# Patient Record
Sex: MALE | Race: WHITE | NOT HISPANIC OR LATINO
[De-identification: names, ages, dates, MRNs, and addresses within clinical notes are randomized per-mention and may not be internally consistent; named-entity substitution may affect disease eponyms.]

---

## 2017-02-06 ENCOUNTER — MEDICATION RENEWAL (OUTPATIENT)
Age: 72
End: 2017-02-06

## 2017-05-08 ENCOUNTER — RX RENEWAL (OUTPATIENT)
Age: 72
End: 2017-05-08

## 2017-08-07 ENCOUNTER — RX RENEWAL (OUTPATIENT)
Age: 72
End: 2017-08-07

## 2017-08-07 ENCOUNTER — MEDICATION RENEWAL (OUTPATIENT)
Age: 72
End: 2017-08-07

## 2017-08-24 ENCOUNTER — APPOINTMENT (OUTPATIENT)
Dept: CARDIOLOGY | Facility: CLINIC | Age: 72
End: 2017-08-24
Payer: MEDICARE

## 2017-08-24 ENCOUNTER — NON-APPOINTMENT (OUTPATIENT)
Age: 72
End: 2017-08-24

## 2017-08-24 VITALS
HEIGHT: 70 IN | SYSTOLIC BLOOD PRESSURE: 155 MMHG | DIASTOLIC BLOOD PRESSURE: 83 MMHG | HEART RATE: 74 BPM | OXYGEN SATURATION: 94 % | BODY MASS INDEX: 28.06 KG/M2 | WEIGHT: 196 LBS

## 2017-08-24 DIAGNOSIS — I25.5 ISCHEMIC CARDIOMYOPATHY: ICD-10-CM

## 2017-08-24 PROCEDURE — 93000 ELECTROCARDIOGRAM COMPLETE: CPT

## 2017-08-24 PROCEDURE — 99215 OFFICE O/P EST HI 40 MIN: CPT

## 2017-08-31 ENCOUNTER — APPOINTMENT (OUTPATIENT)
Dept: CARDIOLOGY | Facility: CLINIC | Age: 72
End: 2017-08-31
Payer: MEDICARE

## 2017-08-31 PROCEDURE — 93880 EXTRACRANIAL BILAT STUDY: CPT

## 2017-09-08 ENCOUNTER — APPOINTMENT (OUTPATIENT)
Dept: CARDIOLOGY | Facility: CLINIC | Age: 72
End: 2017-09-08
Payer: MEDICARE

## 2017-09-08 PROCEDURE — 93306 TTE W/DOPPLER COMPLETE: CPT

## 2018-05-21 ENCOUNTER — RX RENEWAL (OUTPATIENT)
Age: 73
End: 2018-05-21

## 2018-07-03 ENCOUNTER — APPOINTMENT (OUTPATIENT)
Dept: CARDIOLOGY | Facility: CLINIC | Age: 73
End: 2018-07-03
Payer: MEDICARE

## 2018-07-03 ENCOUNTER — NON-APPOINTMENT (OUTPATIENT)
Age: 73
End: 2018-07-03

## 2018-07-03 VITALS
DIASTOLIC BLOOD PRESSURE: 79 MMHG | WEIGHT: 196 LBS | OXYGEN SATURATION: 95 % | HEIGHT: 70 IN | HEART RATE: 69 BPM | BODY MASS INDEX: 28.06 KG/M2 | SYSTOLIC BLOOD PRESSURE: 149 MMHG

## 2018-07-03 DIAGNOSIS — K22.70 BARRETT'S ESOPHAGUS W/OUT DYSPLASIA: ICD-10-CM

## 2018-07-03 DIAGNOSIS — K21.9 GASTRO-ESOPHAGEAL REFLUX DISEASE W/OUT ESOPHAGITIS: ICD-10-CM

## 2018-07-03 PROCEDURE — 93000 ELECTROCARDIOGRAM COMPLETE: CPT

## 2018-07-03 PROCEDURE — 99215 OFFICE O/P EST HI 40 MIN: CPT

## 2018-07-03 RX ORDER — PANTOPRAZOLE 40 MG/1
40 TABLET, DELAYED RELEASE ORAL
Qty: 90 | Refills: 0 | Status: ACTIVE | COMMUNITY
Start: 2017-11-21

## 2018-08-15 ENCOUNTER — RX RENEWAL (OUTPATIENT)
Age: 73
End: 2018-08-15

## 2018-08-28 ENCOUNTER — MOBILE ON CALL (OUTPATIENT)
Age: 73
End: 2018-08-28

## 2018-08-29 ENCOUNTER — APPOINTMENT (OUTPATIENT)
Dept: CARDIOLOGY | Facility: CLINIC | Age: 73
End: 2018-08-29
Payer: MEDICARE

## 2018-08-29 PROCEDURE — A9500: CPT

## 2018-08-29 PROCEDURE — 93015 CV STRESS TEST SUPVJ I&R: CPT

## 2018-08-29 PROCEDURE — 78452 HT MUSCLE IMAGE SPECT MULT: CPT

## 2018-10-15 ENCOUNTER — APPOINTMENT (OUTPATIENT)
Dept: CARDIOLOGY | Facility: CLINIC | Age: 73
End: 2018-10-15
Payer: MEDICARE

## 2018-10-15 ENCOUNTER — NON-APPOINTMENT (OUTPATIENT)
Age: 73
End: 2018-10-15

## 2018-10-15 VITALS
OXYGEN SATURATION: 94 % | WEIGHT: 194 LBS | HEIGHT: 70 IN | BODY MASS INDEX: 27.77 KG/M2 | SYSTOLIC BLOOD PRESSURE: 153 MMHG | DIASTOLIC BLOOD PRESSURE: 80 MMHG | HEART RATE: 62 BPM

## 2018-10-15 VITALS — DIASTOLIC BLOOD PRESSURE: 80 MMHG | SYSTOLIC BLOOD PRESSURE: 140 MMHG

## 2018-10-15 PROCEDURE — 99214 OFFICE O/P EST MOD 30 MIN: CPT

## 2018-10-15 PROCEDURE — 93000 ELECTROCARDIOGRAM COMPLETE: CPT

## 2018-10-15 RX ORDER — RANITIDINE 150 MG/1
150 TABLET ORAL
Qty: 30 | Refills: 0 | Status: DISCONTINUED | COMMUNITY
Start: 2018-07-03 | End: 2018-10-15

## 2018-10-25 ENCOUNTER — APPOINTMENT (OUTPATIENT)
Dept: CARDIOLOGY | Facility: CLINIC | Age: 73
End: 2018-10-25
Payer: MEDICARE

## 2018-10-25 PROCEDURE — 93880 EXTRACRANIAL BILAT STUDY: CPT

## 2018-10-29 LAB
25(OH)D3 SERPL-MCNC: 29.5 NG/ML
ALBUMIN SERPL ELPH-MCNC: 4.5 G/DL
ALP BLD-CCNC: 52 U/L
ALT SERPL-CCNC: 13 U/L
ANION GAP SERPL CALC-SCNC: 13 MMOL/L
APPEARANCE: CLEAR
AST SERPL-CCNC: 27 U/L
BASOPHILS # BLD AUTO: 0.05 K/UL
BASOPHILS NFR BLD AUTO: 0.7 %
BILIRUB SERPL-MCNC: 0.3 MG/DL
BILIRUBIN URINE: NEGATIVE
BLOOD URINE: NEGATIVE
BUN SERPL-MCNC: 17 MG/DL
CALCIUM SERPL-MCNC: 9.9 MG/DL
CHLORIDE SERPL-SCNC: 103 MMOL/L
CHOLEST SERPL-MCNC: 176 MG/DL
CHOLEST/HDLC SERPL: 3 RATIO
CO2 SERPL-SCNC: 29 MMOL/L
COLOR: YELLOW
CREAT SERPL-MCNC: 1.11 MG/DL
EOSINOPHIL # BLD AUTO: 0.13 K/UL
EOSINOPHIL NFR BLD AUTO: 1.9 %
GLUCOSE QUALITATIVE U: NEGATIVE MG/DL
GLUCOSE SERPL-MCNC: 94 MG/DL
HBA1C MFR BLD HPLC: 5.6 %
HCT VFR BLD CALC: 46.4 %
HDLC SERPL-MCNC: 58 MG/DL
HGB BLD-MCNC: 14.2 G/DL
IMM GRANULOCYTES NFR BLD AUTO: 0.3 %
KETONES URINE: NEGATIVE
LDLC SERPL CALC-MCNC: 104 MG/DL
LEUKOCYTE ESTERASE URINE: NEGATIVE
LYMPHOCYTES # BLD AUTO: 0.87 K/UL
LYMPHOCYTES NFR BLD AUTO: 12.6 %
MAN DIFF?: NORMAL
MCHC RBC-ENTMCNC: 30.6 GM/DL
MCHC RBC-ENTMCNC: 31.1 PG
MCV RBC AUTO: 101.5 FL
MONOCYTES # BLD AUTO: 0.5 K/UL
MONOCYTES NFR BLD AUTO: 7.2 %
NEUTROPHILS # BLD AUTO: 5.34 K/UL
NEUTROPHILS NFR BLD AUTO: 77.3 %
NITRITE URINE: NEGATIVE
PH URINE: 5.5
PLATELET # BLD AUTO: 354 K/UL
POTASSIUM SERPL-SCNC: 5.1 MMOL/L
PROT SERPL-MCNC: 7.2 G/DL
PROTEIN URINE: NEGATIVE MG/DL
RBC # BLD: 4.57 M/UL
RBC # FLD: 13.8 %
SODIUM SERPL-SCNC: 145 MMOL/L
SPECIFIC GRAVITY URINE: 1.01
TRIGL SERPL-MCNC: 69 MG/DL
UROBILINOGEN URINE: NEGATIVE MG/DL
WBC # FLD AUTO: 6.91 K/UL

## 2019-06-12 ENCOUNTER — RX RENEWAL (OUTPATIENT)
Age: 74
End: 2019-06-12

## 2019-08-26 ENCOUNTER — RX RENEWAL (OUTPATIENT)
Age: 74
End: 2019-08-26

## 2020-05-18 ENCOUNTER — EMERGENCY (EMERGENCY)
Facility: HOSPITAL | Age: 75
LOS: 1 days | Discharge: HOME | End: 2020-05-18
Attending: EMERGENCY MEDICINE | Admitting: EMERGENCY MEDICINE
Payer: MEDICARE

## 2020-05-18 VITALS
TEMPERATURE: 98 F | RESPIRATION RATE: 18 BRPM | WEIGHT: 190.04 LBS | HEART RATE: 71 BPM | DIASTOLIC BLOOD PRESSURE: 86 MMHG | SYSTOLIC BLOOD PRESSURE: 173 MMHG | HEIGHT: 70 IN | OXYGEN SATURATION: 96 %

## 2020-05-18 DIAGNOSIS — Z95.1 PRESENCE OF AORTOCORONARY BYPASS GRAFT: Chronic | ICD-10-CM

## 2020-05-18 PROCEDURE — 12001 RPR S/N/AX/GEN/TRNK 2.5CM/<: CPT

## 2020-05-18 PROCEDURE — 99283 EMERGENCY DEPT VISIT LOW MDM: CPT | Mod: 25

## 2020-05-18 RX ORDER — BALSALAZIDE DISODIUM 750 MG/1
3 CAPSULE ORAL
Qty: 0 | Refills: 0 | DISCHARGE

## 2020-05-18 RX ORDER — CEPHALEXIN 500 MG
1 CAPSULE ORAL
Qty: 21 | Refills: 0
Start: 2020-05-18 | End: 2020-05-24

## 2020-05-18 RX ORDER — SIMVASTATIN 20 MG/1
1 TABLET, FILM COATED ORAL
Qty: 0 | Refills: 0 | DISCHARGE

## 2020-05-18 RX ORDER — PANTOPRAZOLE SODIUM 20 MG/1
1 TABLET, DELAYED RELEASE ORAL
Qty: 0 | Refills: 0 | DISCHARGE

## 2020-05-18 RX ORDER — METOPROLOL TARTRATE 50 MG
1 TABLET ORAL
Qty: 0 | Refills: 0 | DISCHARGE

## 2020-05-18 RX ORDER — ASPIRIN/CALCIUM CARB/MAGNESIUM 324 MG
1 TABLET ORAL
Qty: 0 | Refills: 0 | DISCHARGE

## 2020-05-18 NOTE — ED PROVIDER NOTE - CLINICAL SUMMARY MEDICAL DECISION MAKING FREE TEXT BOX
73 yo M p/w lac to 2nd digit DIP 2x1 cm U shaped with clean kitchen knife. low suspicion for Foreign body and furthermore wound was extensively irrigated by me. tdap UTD (within 10 years)

## 2020-05-18 NOTE — ED PROVIDER NOTE - OBJECTIVE STATEMENT
73 yo M p/w lac to left 2nd digit lateral DIP, was using a clean kitchen knife to cut rope for packaging. last tdap within 10 years (pt thinks within the last 5 years). denies fever, cough, sob, recent travel.

## 2020-05-18 NOTE — ED PROVIDER NOTE - PATIENT PORTAL LINK FT
You can access the FollowMyHealth Patient Portal offered by [facility] by registering at the following website: [email or web address]. By joining [facility]’s FollowMyHealth portal, you will also be able to view your health information using other applications (apps) compatible with our system.

## 2020-05-27 ENCOUNTER — EMERGENCY (EMERGENCY)
Facility: HOSPITAL | Age: 75
LOS: 1 days | Discharge: HOME | End: 2020-05-27
Attending: EMERGENCY MEDICINE | Admitting: EMERGENCY MEDICINE
Payer: MEDICARE

## 2020-05-27 VITALS
SYSTOLIC BLOOD PRESSURE: 169 MMHG | RESPIRATION RATE: 17 BRPM | TEMPERATURE: 98 F | DIASTOLIC BLOOD PRESSURE: 94 MMHG | WEIGHT: 195.11 LBS | HEART RATE: 68 BPM | OXYGEN SATURATION: 95 % | HEIGHT: 70 IN

## 2020-05-27 DIAGNOSIS — Z95.1 PRESENCE OF AORTOCORONARY BYPASS GRAFT: Chronic | ICD-10-CM

## 2020-05-27 PROBLEM — K52.9 NONINFECTIVE GASTROENTERITIS AND COLITIS, UNSPECIFIED: Chronic | Status: ACTIVE | Noted: 2020-05-18

## 2020-05-27 PROBLEM — C61 MALIGNANT NEOPLASM OF PROSTATE: Chronic | Status: ACTIVE | Noted: 2020-05-18

## 2020-05-27 PROBLEM — K22.70 BARRETT'S ESOPHAGUS WITHOUT DYSPLASIA: Chronic | Status: ACTIVE | Noted: 2020-05-18

## 2020-05-27 PROBLEM — I63.9 CEREBRAL INFARCTION, UNSPECIFIED: Chronic | Status: ACTIVE | Noted: 2020-05-18

## 2020-05-27 PROCEDURE — G0463: CPT

## 2020-05-27 PROCEDURE — L9995: CPT

## 2020-05-27 NOTE — ED STATDOCS - CLINICAL SUMMARY MEDICAL DECISION MAKING FREE TEXT BOX
70 y/o M with L 1st digit lac that was repairs 10 days ago presenting for suture removal 70 y/o M with L 1st digit lac that was repairs 10 days ago presenting for suture removal, redness noted likely due to patients reported use of hydrogen peroxide, no signs of pus or drainage, plan= suture removal and d/c with strict return precautions

## 2020-05-27 NOTE — ED STATDOCS - ATTENDING CONTRIBUTION TO CARE
Pt is a 75 yo male who presents to the ED for suture removal.  PMHx of Camarena's esophagus, Colitis    Prostate cancer, stroke.  Pt is right hand dominant.  He reports that on 5/18 he was seen in the ED after he sustained a laceration to his left hand second digit.  Pt reports that he was using a knife to cut rope for packaging when the knife slipped cutting his left hand second digit.  He was seen in the ED, wound was cleaned, and repaired with sutures.  Pt reports that his Tetanus was UTD.  He was discharged home on keflex and completed the course.  He presents today for suture removal.  Pt reports that he has had no complications since procedure.  He denies pain, swelling, or drainage from the site. Denies limited ROM.  Pt reports that he has been cleaning the site with peroxide several times a day and has also been applying Bacitracin to the wound.  Denies fever, chills, CP, SOB.  Denies numbness or tingling to ext.  On exam pt sitting in bed NAD,  Left hand 2nd digit:       75 yo M p/w lac to left 2nd digit lateral DIP, was using a clean kitchen knife to cut rope for packaging. last tdap within 10 years (pt thinks within the last 5 years). denies fever, cough, sob, recent travel.    73 y/o M presents to ED for suture removal. Had cut L second digit with kitchen knife on 5/18/20. Was seen in this ED, had lac repaired with 10 sutures and placed on keflex which he completed. Denies fever, chills, pain or swelling to digit. States has been suing peroxide, Pt is a 75 yo male who presents to the ED for suture removal.  PMHx of Camarena's esophagus, Colitis    Prostate cancer, stroke.  Pt is right hand dominant.  He reports that on 5/18 he was seen in the ED after he sustained a laceration to his left hand second digit.  Pt reports that he was using a knife to cut rope for packaging when the knife slipped cutting his left hand second digit.  He was seen in the ED, wound was cleaned, and repaired with sutures.  Pt reports that his Tetanus was UTD.  He was discharged home on keflex and completed the course.  He presents today for suture removal.  Pt reports that he has had no complications since procedure.  He denies pain, swelling, or drainage from the site. Denies limited ROM.  Pt reports that he has been cleaning the site with peroxide several times a day and has also been applying Bacitracin to the wound.  Denies fever, chills, CP, SOB.  Denies numbness or tingling to ext.  On exam pt sitting in bed NAD,  Left hand 2nd digit: 1.5 cm x 2.5 cm U shaped healing lac noted to L 2nd digit distal aspect with sutures in place and surrounding redness but no increased warmth.  Appears to have mild maceration of skin likely secondary to Peroxide washing. No swelling or purulent drainage noted. FROM of digit with intact sensation, cap refill less then 2 seconds.  Pt presenting for suture removal mild skin irritation likely secondary to Peroxide use Pt is a 75 yo male who presents to the ED for suture removal.  PMHx of Camarena's esophagus, Colitis    Prostate cancer, stroke.  Pt is right hand dominant.  He reports that on 5/18 he was seen in the ED after he sustained a laceration to his left hand second digit.  Pt reports that he was using a knife to cut rope for packaging when the knife slipped cutting his left hand second digit.  He was seen in the ED, wound was cleaned, and repaired with sutures.  Pt reports that his Tetanus was UTD.  He was discharged home on keflex and completed the course.  He presents today for suture removal.  Pt reports that he has had no complications since procedure.  He denies pain, swelling, or drainage from the site. Denies limited ROM.  Pt reports that he has been cleaning the site with peroxide several times a day and has also been applying Bacitracin to the wound.  Denies fever, chills, CP, SOB.  Denies numbness or tingling to ext.  On exam pt sitting in bed NAD,  Left hand 2nd digit: 1.5 cm x 2.5 cm U shaped healing lac noted to L 2nd digit distal aspect with sutures in place and surrounding redness but no increased warmth.  Appears to have mild maceration of skin likely secondary to Peroxide washing. No swelling or purulent drainage noted. FROM of digit with intact sensation, cap refill less then 2 seconds. No TTP.  Pt presenting for suture removal mild skin irritation likely secondary to Peroxide use.  No evidence of cellulitis at this time redness appeared to be secondary to skin irritation.  Sutures removed, Bacitracin applied.  Pt advised to stop Peroxide use.  Will discharge home.  Pt instructed to monitor site, apply Bacitracin and return immediately for increased redness, warmth, pain or for any other concerns.

## 2020-05-27 NOTE — ED ADULT NURSE NOTE - NSIMPLEMENTINTERV_GEN_ALL_ED
Implemented All Universal Safety Interventions:  [place] to call system. Call bell, personal items and telephone within reach. Instruct patient to call for assistance. Room bathroom lighting operational. Non-slip footwear when patient is off stretcher. Physically safe environment: no spills, clutter or unnecessary equipment. Stretcher in lowest position, wheels locked, appropriate side rails in place.

## 2020-05-27 NOTE — ED STATDOCS - NSFOLLOWUPINSTRUCTIONS_ED_ALL_ED_FT
KEEP AREA CLEAN AND DRY  APPLY BACITRACIN 2 TIMES DAILY  KEEP COVERED IF YOU GO OUT  RETURN FOR ANY FEVER, REDNESS, SWELLING, PUS, PAIN IN FINGER  STOP USING HYDROGEN PEROXIDE ON WOUND       Suture/Staple Removal    After having your stitches or staples removed it is typical to have minor discomfort, swelling, or redness in the area. The wound is still healing so continue to protect it from injury. Keep the wound dry and if given creams, ointments, or medication, take as instructed to by your health care professional.    SEEK IMMEDIATE MEDICAL CARE IF YOU HAVE ANY OF THE FOLLOWING SYMPTOMS: increasing redness/swelling/pain in the wound, pus coming from the wound, bad smell coming from the wound, or fever.

## 2020-05-27 NOTE — ED ADULT NURSE NOTE - OBJECTIVE STATEMENT
Presents to ER with left 2nd digit suture removal. Pt states he cut himself with a knife.  Site slightly red, Dr Reeves aware, denies any pain or discharge.  Pt states he was placed on antibx and did complete dose prescribed.

## 2020-05-27 NOTE — ED STATDOCS - OBJECTIVE STATEMENT
73 y/o M presents to ED for suture removal. Had cut L second digit with kitchen knife on 5/18/20. Was seen in this ED, had lac repaired with 10 sutures and placed on keflex which he completed. Denies fever, chills, pain or swelling to digit. States has been suing peroxide,

## 2020-05-27 NOTE — ED STATDOCS - PHYSICAL EXAMINATION
1.5 cm x 2.5 cm U shaped healing lac noted to L 2nd digit with sutures in place and surrounding redness, no pus or drainage, sensation intact

## 2020-06-01 ENCOUNTER — RX RENEWAL (OUTPATIENT)
Age: 75
End: 2020-06-01

## 2020-07-24 ENCOUNTER — RX RENEWAL (OUTPATIENT)
Age: 75
End: 2020-07-24

## 2020-09-25 ENCOUNTER — RX RENEWAL (OUTPATIENT)
Age: 75
End: 2020-09-25

## 2020-10-05 ENCOUNTER — NON-APPOINTMENT (OUTPATIENT)
Age: 75
End: 2020-10-05

## 2020-10-05 ENCOUNTER — APPOINTMENT (OUTPATIENT)
Dept: CARDIOLOGY | Facility: CLINIC | Age: 75
End: 2020-10-05
Payer: MEDICARE

## 2020-10-05 VITALS
OXYGEN SATURATION: 94 % | DIASTOLIC BLOOD PRESSURE: 80 MMHG | WEIGHT: 197 LBS | SYSTOLIC BLOOD PRESSURE: 160 MMHG | HEIGHT: 70 IN | BODY MASS INDEX: 28.2 KG/M2 | HEART RATE: 70 BPM

## 2020-10-05 PROCEDURE — 99214 OFFICE O/P EST MOD 30 MIN: CPT

## 2020-10-05 PROCEDURE — 93000 ELECTROCARDIOGRAM COMPLETE: CPT

## 2020-10-05 NOTE — DISCUSSION/SUMMARY
[FreeTextEntry1] : Christopher has been feeling well. His blood pressure has been somewhat high even at home, and the range of 140/90. He will start amlodipine 5 mg daily.\par \par He will get blood work.\par \par He will schedule an echo.\par \par He will see me in the office in one month for another assessment of his blood pressure.\par \par

## 2020-10-05 NOTE — REASON FOR VISIT
[Follow-Up - Clinic] : a clinic follow-up of [Coronary Artery Disease] : coronary artery disease [Hyperlipidemia] : hyperlipidemia [Hypertension] : hypertension [FreeTextEntry1] : PVC's

## 2020-10-05 NOTE — CARDIOLOGY SUMMARY
[Normal] : normal [No Ischemia] : no Ischemia [No Exercise Ind Arr] : no exercise induced arrhythmias [No Symptoms] : no Symptoms [LVEF ___%] : LVEF [unfilled]% [None] : no pulmonary hypertension [Enlarged] : enlarged LA size [Mild] : mild mitral regurgitation [___] : [unfilled]

## 2020-10-05 NOTE — HISTORY OF PRESENT ILLNESS
[FreeTextEntry1] : Christopher El presented to the office today for a follow up evaluation. He was last seen in the office in 2018.\par \par He is now 75 years old, with a history of coronary artery disease, status post bypass in January of 2007. He has a history of treated hypercholesterolemia as well as treated hypertension. 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 \par \par He presented to the office today having been feeling reasonably well overall. His reflux symptoms are well controlled. He does not report symptoms of angina, heart failure or arrhythmia.

## 2020-10-05 NOTE — PHYSICAL EXAM
[General Appearance - Well Developed] : well developed [Normal Appearance] : normal appearance [Well Groomed] : well groomed [General Appearance - Well Nourished] : well nourished [No Deformities] : no deformities [General Appearance - In No Acute Distress] : no acute distress [Normal Conjunctiva] : the conjunctiva exhibited no abnormalities [Eyelids - No Xanthelasma] : the eyelids demonstrated no xanthelasmas [Normal Oral Mucosa] : normal oral mucosa [No Oral Pallor] : no oral pallor [No Oral Cyanosis] : no oral cyanosis [Normal Jugular Venous A Waves Present] : normal jugular venous A waves present [Normal Jugular Venous V Waves Present] : normal jugular venous V waves present [No Jugular Venous Swain A Waves] : no jugular venous swain A waves [Respiration, Rhythm And Depth] : normal respiratory rhythm and effort [Exaggerated Use Of Accessory Muscles For Inspiration] : no accessory muscle use [Auscultation Breath Sounds / Voice Sounds] : lungs were clear to auscultation bilaterally [Abdomen Soft] : soft [Abdomen Tenderness] : non-tender [Abdomen Mass (___ Cm)] : no abdominal mass palpated [Abnormal Walk] : normal gait [Gait - Sufficient For Exercise Testing] : the gait was sufficient for exercise testing [Nail Clubbing] : no clubbing of the fingernails [Cyanosis, Localized] : no localized cyanosis [Petechial Hemorrhages (___cm)] : no petechial hemorrhages [Skin Color & Pigmentation] : normal skin color and pigmentation [] : no rash [No Venous Stasis] : no venous stasis [Skin Lesions] : no skin lesions [No Skin Ulcers] : no skin ulcer [No Xanthoma] : no  xanthoma was observed [Oriented To Time, Place, And Person] : oriented to person, place, and time [Affect] : the affect was normal [Mood] : the mood was normal [No Anxiety] : not feeling anxious [Normal] : normal [Normal Rate] : normal [Rhythm Regular] : regular [Normal S1] : normal S1 [Normal S2] : normal S2 [No Gallop] : no gallop heard [No Murmur] : no murmurs heard [2+] : left 2+ [No Pitting Edema] : no pitting edema present [S3] : no S3 [S4] : no S4 [Right Carotid Bruit] : no bruit heard over the right carotid [Left Carotid Bruit] : no bruit heard over the left carotid [Right Femoral Bruit] : no bruit heard over the right femoral artery [Left Femoral Bruit] : no bruit heard over the left femoral artery [Bruit] : no bruit heard

## 2020-11-10 ENCOUNTER — APPOINTMENT (OUTPATIENT)
Dept: CARDIOLOGY | Facility: CLINIC | Age: 75
End: 2020-11-10
Payer: MEDICARE

## 2020-11-10 VITALS
OXYGEN SATURATION: 95 % | HEART RATE: 85 BPM | DIASTOLIC BLOOD PRESSURE: 81 MMHG | BODY MASS INDEX: 28.63 KG/M2 | HEIGHT: 70 IN | WEIGHT: 200 LBS | SYSTOLIC BLOOD PRESSURE: 158 MMHG

## 2020-11-10 PROCEDURE — 99214 OFFICE O/P EST MOD 30 MIN: CPT

## 2020-11-10 NOTE — PHYSICAL EXAM
[General Appearance - Well Developed] : well developed [Normal Appearance] : normal appearance [Well Groomed] : well groomed [General Appearance - Well Nourished] : well nourished [No Deformities] : no deformities [General Appearance - In No Acute Distress] : no acute distress [Normal Conjunctiva] : the conjunctiva exhibited no abnormalities [Eyelids - No Xanthelasma] : the eyelids demonstrated no xanthelasmas [Normal Oral Mucosa] : normal oral mucosa [No Oral Pallor] : no oral pallor [No Oral Cyanosis] : no oral cyanosis [Normal Jugular Venous A Waves Present] : normal jugular venous A waves present [Normal Jugular Venous V Waves Present] : normal jugular venous V waves present [No Jugular Venous Swain A Waves] : no jugular venous swain A waves [Abnormal Walk] : normal gait [Gait - Sufficient For Exercise Testing] : the gait was sufficient for exercise testing [Nail Clubbing] : no clubbing of the fingernails [Cyanosis, Localized] : no localized cyanosis [Petechial Hemorrhages (___cm)] : no petechial hemorrhages [Skin Color & Pigmentation] : normal skin color and pigmentation [] : no rash [No Venous Stasis] : no venous stasis [Skin Lesions] : no skin lesions [No Skin Ulcers] : no skin ulcer [No Xanthoma] : no  xanthoma was observed [Oriented To Time, Place, And Person] : oriented to person, place, and time [Affect] : the affect was normal [Mood] : the mood was normal [No Anxiety] : not feeling anxious

## 2020-11-10 NOTE — HISTORY OF PRESENT ILLNESS
[FreeTextEntry1] : Christopher El presented to the office today for a follow up evaluation. He was last seen in the office last month.\par \par He is now 75 years old, with a history of coronary artery disease, status post bypass in January of 2007. He has a history of treated hypercholesterolemia as well as treated hypertension. 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 \par \par At the last visit, his blood pressure was elevated, and I started amlodipine 5 mg daily. He is feeling reasonably well overall. He does not report symptoms of angina, heart failure or arrhythmia. He's been checking his blood pressure fairly regularly. It is commonly in the 140s and 150s.

## 2020-11-10 NOTE — DISCUSSION/SUMMARY
[FreeTextEntry1] : Christopher has been feeling well. His blood pressure has been somewhat high even at home, and the range of 140-150. He will increase amlodipine to 10mg daily.\par \par He has an echo scheduled soon.\par \par He will see me in the office in 3 weeks for another assessment of his blood pressure. He will bring a list of readings to review.\par \par

## 2020-11-20 ENCOUNTER — APPOINTMENT (OUTPATIENT)
Dept: CARDIOLOGY | Facility: CLINIC | Age: 75
End: 2020-11-20
Payer: MEDICARE

## 2020-11-20 PROCEDURE — 93306 TTE W/DOPPLER COMPLETE: CPT

## 2020-12-01 ENCOUNTER — APPOINTMENT (OUTPATIENT)
Dept: CARDIOLOGY | Facility: CLINIC | Age: 75
End: 2020-12-01
Payer: MEDICARE

## 2020-12-01 VITALS
HEIGHT: 70 IN | SYSTOLIC BLOOD PRESSURE: 141 MMHG | BODY MASS INDEX: 28.63 KG/M2 | WEIGHT: 200 LBS | OXYGEN SATURATION: 95 % | DIASTOLIC BLOOD PRESSURE: 76 MMHG | HEART RATE: 80 BPM

## 2020-12-01 PROCEDURE — 99214 OFFICE O/P EST MOD 30 MIN: CPT

## 2020-12-01 NOTE — DISCUSSION/SUMMARY
[FreeTextEntry1] : Christopher has been feeling well. His blood pressure is now excellent. We will make no additional changes.\par  \par \par He will see me in the office in 6 months.

## 2020-12-01 NOTE — HISTORY OF PRESENT ILLNESS
[FreeTextEntry1] : Christopher El presented to the office today for a follow up evaluation. He was last seen in the office 2 weeks ago.\par \par He is now 75 years old, with a history of coronary artery disease, status post bypass in January of 2007. He has a history of treated hypercholesterolemia as well as treated hypertension. 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 \par \par At the last visit, his blood pressure was elevated, and I increased amlodipine to 10 mg daily. \par \par He is feeling reasonably well overall. He does not report symptoms of angina, heart failure or arrhythmia. He's been checking his blood pressure fairly regularly. It is commonly in the 120s.

## 2021-09-23 ENCOUNTER — RX RENEWAL (OUTPATIENT)
Age: 76
End: 2021-09-23

## 2021-09-23 ENCOUNTER — NON-APPOINTMENT (OUTPATIENT)
Age: 76
End: 2021-09-23

## 2021-09-23 ENCOUNTER — APPOINTMENT (OUTPATIENT)
Dept: CARDIOLOGY | Facility: CLINIC | Age: 76
End: 2021-09-23
Payer: MEDICARE

## 2021-09-23 VITALS
DIASTOLIC BLOOD PRESSURE: 79 MMHG | BODY MASS INDEX: 28.63 KG/M2 | HEART RATE: 69 BPM | OXYGEN SATURATION: 96 % | WEIGHT: 200 LBS | SYSTOLIC BLOOD PRESSURE: 156 MMHG | HEIGHT: 70 IN

## 2021-09-23 VITALS — SYSTOLIC BLOOD PRESSURE: 135 MMHG | DIASTOLIC BLOOD PRESSURE: 70 MMHG

## 2021-09-23 PROCEDURE — 99214 OFFICE O/P EST MOD 30 MIN: CPT

## 2021-09-23 PROCEDURE — 93000 ELECTROCARDIOGRAM COMPLETE: CPT

## 2021-09-23 NOTE — HISTORY OF PRESENT ILLNESS
[FreeTextEntry1] : Christopher El presented to the office today for a follow up evaluation. He was last seen in the office 12/2020..\par \par He is now 76 years old, with a history of coronary artery disease, status post bypass in January of 2007. He has a history of treated hypercholesterolemia as well as treated hypertension. 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 \par \par At the last visit, his blood pressure was elevated, and I increased amlodipine to 10 mg daily. \par \par He is feeling reasonably well overall.  His blood pressure is regularly controlled.  He does have a degree of dyspnea when he walks about half way around the block, but feels that this is less likely to be cardiac, and seemingly more likely to reflect some orthopedic issues.  He specifically denies angina.  He denies orthopnea, PND and edema.  He reports having had a colonoscopy recently, without any abnormal findings.

## 2021-09-23 NOTE — DISCUSSION/SUMMARY
[FreeTextEntry1] : Christopher has been feeling well. His blood pressure is now excellent. We will make no additional changes.\par  \par His dyspnea may or may not be related to underlying structural heart disease.  We discussed the possibility of a nuclear stress test.  Given his ulcerative colitis, he is afraid about the potential for needing to have an emergency bowel movement, and is not enthusiastic about scheduling it.  He promises to let me know if his dyspnea gets any worse.\par \par He will schedule a carotid ultrasound, and have blood work.\par \par He will see me in the office in 6 months.

## 2021-10-12 ENCOUNTER — APPOINTMENT (OUTPATIENT)
Dept: CARDIOLOGY | Facility: CLINIC | Age: 76
End: 2021-10-12
Payer: MEDICARE

## 2021-10-12 PROCEDURE — 93880 EXTRACRANIAL BILAT STUDY: CPT

## 2021-12-20 ENCOUNTER — RX RENEWAL (OUTPATIENT)
Age: 76
End: 2021-12-20

## 2022-01-03 ENCOUNTER — RX RENEWAL (OUTPATIENT)
Age: 77
End: 2022-01-03

## 2022-03-16 ENCOUNTER — RX RENEWAL (OUTPATIENT)
Age: 77
End: 2022-03-16

## 2022-04-12 NOTE — ED PROVIDER NOTE - GASTROINTESTINAL NEGATIVE STATEMENT, MLM
Referred by: Kjersti E Knox, MD; Medical Diagnosis (from order):    Diagnosis Information      Diagnosis    724.5, 338.29 (ICD-9-CM) - M54.9, G89.29 (ICD-10-CM) - Chronic bilateral back pain, unspecified back location                Physical Therapy -  Daily Treatment Note    Visit:  2     SUBJECTIVE                                                                                                               Pt notes woke up with worse back pain.  Pt notes doing HEP once in awhile.  Pt declines offer to go to ED with pain rated at 10/10 today.  Pt notes exercises help to control the pain.    Functional Change: Per above.  Pain / Symptoms:  Pain rating (out of 10): Current: 10     OBJECTIVE                                                                                                                     Observation:   Comments / Details: Pt very tired throughout treatment and had some LOB that were independently corrected- one at end where pt walked into wall, but okay after and no falls;        TREATMENT                                                                                                                  Therapeutic Exercise:  Supine:  Lower trunk rotation x 1 minute  Straight leg raise 2 x 10 left/right cuing to keep leg straight  Gluteal bridges 2 x 10   Single knee to chest stretch 2x30 sec bilateral   Leg press 4 plates x 10       Sidelying:  Clam shells 2x10 bilateral     Seated:  NuStep arms/legs level 3, 5 minutes for strength and range of motion - monitoring for tolerance throughout  Posture education with posture starting at hips -extensive cuing for correct performance     Standing:  Mini squats with rail support x 10   Sit to stands from large tall chair -cuing to not use UE  2 x 10 - verbal cues to inhibit genu valgum, hip hinge  Standing at ballet bar:  Marches 2x10;  Hip ABD bilateral 2x10          Skilled input: verbal instruction/cues and as detailed above    Writer verbally educated and  received verbal consent for hand placement, positioning of patient, and techniques to be performed today from patient for therapist position for techniques, clothing adjustments for techniques and hand placement and palpation for techniques as described above and how they are pertinent to the patient's plan of care.    Home Exercise Program/Education Materials: Access Code: [email or web address]  URL: [email or web address]  Date: 03/22/2022  Prepared by: Iraida Swift    Exercises  · Supine Lower Trunk Rotation - 1 x daily - 7 x weekly - 2 sets - 60 seconds hold  · Active Straight Leg Raise with Quad Set - 1 x daily - 7 x weekly - 2 sets - 10 reps  · Supine Bridge with Gluteal Set and Spinal Articulation - 1 x daily - 7 x weekly - 2 sets - 10 reps  · Sit to Stand - 1 x daily - 7 x weekly - 2 sets - 10 reps              ASSESSMENT                                                                                                             Pt notes felt better after treatment, but pain not rated.  Pt needed redirection throughout PT to keep on task and PT SBA for balance at this date.  Pt able to progress with activities in therapy and given new handout for increased consistency of HEP performance.    Patient Education:   Results of above outlined education: Needs reinforcement      PLAN                                                                                                                           Suggestions for next session as indicated: Progress per plan of care         Therapy procedure time and total treatment time can be found documented on the Time Entry flowsheet   no abdominal pain, no bloating, no constipation, no diarrhea, no nausea and no vomiting.

## 2022-10-21 ENCOUNTER — NON-APPOINTMENT (OUTPATIENT)
Age: 77
End: 2022-10-21

## 2023-02-16 ENCOUNTER — NON-APPOINTMENT (OUTPATIENT)
Age: 78
End: 2023-02-16

## 2023-02-16 ENCOUNTER — APPOINTMENT (OUTPATIENT)
Dept: CARDIOLOGY | Facility: CLINIC | Age: 78
End: 2023-02-16
Payer: MEDICARE

## 2023-02-16 VITALS
WEIGHT: 208 LBS | HEIGHT: 70 IN | OXYGEN SATURATION: 96 % | BODY MASS INDEX: 29.78 KG/M2 | DIASTOLIC BLOOD PRESSURE: 76 MMHG | HEART RATE: 68 BPM | SYSTOLIC BLOOD PRESSURE: 171 MMHG

## 2023-02-16 VITALS — SYSTOLIC BLOOD PRESSURE: 154 MMHG | DIASTOLIC BLOOD PRESSURE: 78 MMHG

## 2023-02-16 DIAGNOSIS — I25.10 ATHEROSCLEROTIC HEART DISEASE OF NATIVE CORONARY ARTERY W/OUT ANGINA PECTORIS: ICD-10-CM

## 2023-02-16 DIAGNOSIS — E78.00 PURE HYPERCHOLESTEROLEMIA, UNSPECIFIED: ICD-10-CM

## 2023-02-16 DIAGNOSIS — I10 ESSENTIAL (PRIMARY) HYPERTENSION: ICD-10-CM

## 2023-02-16 PROCEDURE — 99214 OFFICE O/P EST MOD 30 MIN: CPT

## 2023-02-16 PROCEDURE — 93000 ELECTROCARDIOGRAM COMPLETE: CPT

## 2023-02-16 NOTE — PHYSICAL EXAM
[General Appearance - Well Developed] : well developed [Normal Appearance] : normal appearance [Well Groomed] : well groomed [General Appearance - Well Nourished] : well nourished [No Deformities] : no deformities [General Appearance - In No Acute Distress] : no acute distress [Eyelids - No Xanthelasma] : the eyelids demonstrated no xanthelasmas [Normal Oral Mucosa] : normal oral mucosa [No Oral Pallor] : no oral pallor [No Oral Cyanosis] : no oral cyanosis [Normal Jugular Venous A Waves Present] : normal jugular venous A waves present [Normal Jugular Venous V Waves Present] : normal jugular venous V waves present [No Jugular Venous Swain A Waves] : no jugular venous swain A waves [Abnormal Walk] : normal gait [Gait - Sufficient For Exercise Testing] : the gait was sufficient for exercise testing [Nail Clubbing] : no clubbing of the fingernails [Cyanosis, Localized] : no localized cyanosis [Petechial Hemorrhages (___cm)] : no petechial hemorrhages [Skin Color & Pigmentation] : normal skin color and pigmentation [] : no rash [No Venous Stasis] : no venous stasis [Skin Lesions] : no skin lesions [No Skin Ulcers] : no skin ulcer [No Xanthoma] : no  xanthoma was observed [Oriented To Time, Place, And Person] : oriented to person, place, and time [Affect] : the affect was normal [Mood] : the mood was normal [No Anxiety] : not feeling anxious [Well Developed] : well developed [Well Nourished] : well nourished [No Acute Distress] : no acute distress [Normal Conjunctiva] : normal conjunctiva [Normal Venous Pressure] : normal venous pressure [No Carotid Bruit] : no carotid bruit [Normal S1, S2] : normal S1, S2 [No Murmur] : no murmur [No Rub] : no rub [No Gallop] : no gallop [Rhythm Regular] : regular [Normal S1] : normal S1 [Normal S2] : normal S2 [No Pitting Edema] : no pitting edema present [No Abnormalities] : the abdominal aorta was not enlarged and no bruit was heard [Clear Lung Fields] : clear lung fields [Good Air Entry] : good air entry [No Respiratory Distress] : no respiratory distress  [Soft] : abdomen soft [Non Tender] : non-tender [No Masses/organomegaly] : no masses/organomegaly [Normal Bowel Sounds] : normal bowel sounds [Normal Gait] : normal gait [No Edema] : no edema [No Cyanosis] : no cyanosis [No Clubbing] : no clubbing [No Varicosities] : no varicosities [No Rash] : no rash [No Skin Lesions] : no skin lesions [Moves all extremities] : moves all extremities [No Focal Deficits] : no focal deficits [Normal Speech] : normal speech [Alert and Oriented] : alert and oriented [Normal memory] : normal memory

## 2023-02-19 NOTE — REASON FOR VISIT
[Follow-Up - Clinic] : a clinic follow-up of [Hyperlipidemia] : hyperlipidemia [Hypertension] : hypertension [Coronary Artery Disease] : coronary artery disease [FreeTextEntry1] : PVC's

## 2023-02-19 NOTE — HISTORY OF PRESENT ILLNESS
[FreeTextEntry1] : Christopher El presented to the office today for a follow up evaluation. He was last seen in the office 12/2020..\par \par He is now 77 years old, with a history of coronary artery disease, status post bypass in January of 2007. He has a history of treated hypercholesterolemia as well as treated hypertension. 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 \par \par Previously,  his blood pressure was elevated, and I increased amlodipine to 10 mg daily. \par \par Unfortunately, he lost his wife of 55 years 4 months ago to cancer.  He is still mourning her death daily.  "He is very teary whenever he talks about her and has been visiting her grave everyday."\par He is able to ambulate around his neighborhood regularly without distress.  He has needed to walk slowly due to a developing right hip pain. He lives with his DTR and his 2 grandchildren.\par \par He specifically denies angina.  He denies orthopnea, PND and edema.

## 2023-02-19 NOTE — DISCUSSION/SUMMARY
[FreeTextEntry1] : Christopher is in no acute distress.  He does not report symptoms consistent with HF, angina or unstable arrhythmia.  ECG illustrates sinus rhythm , IRBBB, LAD.  Blood pressure is elevated and remains elevated despite resting a while in the office.\par Given it has been awhile since his last non invasive cardiac testing, I have advises that he undergo a 2d echo to assess for any new structural heart disease, changes in valvular and ventricular function. He will have a carotid doppler to assess for worsening atherosclerotic disease, for moderate disease was noted on prior imaging. \par \par He will also return for a blood pressure check before I make any modifications to his regimen.  He is under a bit a stress currently and is mourning the death of his wife.  He will continue his current regimen as prescribed. amlodipine 10mg daily, metoprolol 25mg BID.\par \par for history of ASHD, he will continue ASA 81mg and atorvastatin 40mg.  Goal LDL <70.  \par I will send him for base line blood work.\par \par I will call him with results of the above testing once complete.\par \par If all turns out to be well, he can followup again in 6 months. \par \par  \par

## 2023-02-19 NOTE — CARDIOLOGY SUMMARY
[Normal] : normal [No Ischemia] : no Ischemia [No Exercise Ind Arr] : no exercise induced arrhythmias [No Symptoms] : no Symptoms [LVEF ___%] : LVEF [unfilled]% [None] : no pulmonary hypertension [Enlarged] : enlarged LA size [Mild] : mild mitral regurgitation [___] : [unfilled] [de-identified] : sinus rhythm\par IRBBB\par LAD [de-identified] : 2018 [de-identified] : 2020\par Ef 60%\par NML LV/RV\par mild MR \par dilated asc aorta 3.8 [de-identified] : 2021\par mild - mod L\par mild R

## 2023-02-28 ENCOUNTER — APPOINTMENT (OUTPATIENT)
Dept: CARDIOLOGY | Facility: CLINIC | Age: 78
End: 2023-02-28
Payer: MEDICARE

## 2023-02-28 LAB
ALBUMIN SERPL ELPH-MCNC: 4.8 G/DL
ALP BLD-CCNC: 83 U/L
ALT SERPL-CCNC: 20 U/L
ANION GAP SERPL CALC-SCNC: 17 MMOL/L
AST SERPL-CCNC: 23 U/L
BASOPHILS # BLD AUTO: 0.09 K/UL
BASOPHILS NFR BLD AUTO: 1 %
BILIRUB SERPL-MCNC: 0.6 MG/DL
BUN SERPL-MCNC: 17 MG/DL
CALCIUM SERPL-MCNC: 10.7 MG/DL
CHLORIDE SERPL-SCNC: 101 MMOL/L
CHOLEST SERPL-MCNC: 175 MG/DL
CO2 SERPL-SCNC: 22 MMOL/L
CREAT SERPL-MCNC: 1.17 MG/DL
EGFR: 64 ML/MIN/1.73M2
EOSINOPHIL # BLD AUTO: 0.1 K/UL
EOSINOPHIL NFR BLD AUTO: 1.1 %
ESTIMATED AVERAGE GLUCOSE: 126 MG/DL
GLUCOSE SERPL-MCNC: 105 MG/DL
HBA1C MFR BLD HPLC: 6 %
HCT VFR BLD CALC: 48.1 %
HDLC SERPL-MCNC: 62 MG/DL
HGB BLD-MCNC: 15.3 G/DL
IMM GRANULOCYTES NFR BLD AUTO: 0.3 %
LDLC SERPL CALC-MCNC: 97 MG/DL
LYMPHOCYTES # BLD AUTO: 1.2 K/UL
LYMPHOCYTES NFR BLD AUTO: 13 %
MAN DIFF?: NORMAL
MCHC RBC-ENTMCNC: 31.2 PG
MCHC RBC-ENTMCNC: 31.8 GM/DL
MCV RBC AUTO: 98 FL
MONOCYTES # BLD AUTO: 0.66 K/UL
MONOCYTES NFR BLD AUTO: 7.1 %
NEUTROPHILS # BLD AUTO: 7.17 K/UL
NEUTROPHILS NFR BLD AUTO: 77.5 %
NONHDLC SERPL-MCNC: 112 MG/DL
PLATELET # BLD AUTO: 431 K/UL
POTASSIUM SERPL-SCNC: 4.5 MMOL/L
PROT SERPL-MCNC: 7.4 G/DL
PSA SERPL-MCNC: 1.13 NG/ML
RBC # BLD: 4.91 M/UL
RBC # FLD: 13.7 %
SODIUM SERPL-SCNC: 140 MMOL/L
TRIGL SERPL-MCNC: 80 MG/DL
WBC # FLD AUTO: 9.25 K/UL

## 2023-02-28 PROCEDURE — 93880 EXTRACRANIAL BILAT STUDY: CPT

## 2023-02-28 PROCEDURE — 93306 TTE W/DOPPLER COMPLETE: CPT

## 2023-03-01 LAB — TSH SERPL-ACNC: 1.44 UIU/ML

## 2023-03-29 ENCOUNTER — NON-APPOINTMENT (OUTPATIENT)
Age: 78
End: 2023-03-29

## 2024-02-29 ENCOUNTER — NON-APPOINTMENT (OUTPATIENT)
Age: 79
End: 2024-02-29

## 2024-05-09 RX ORDER — AMLODIPINE BESYLATE 10 MG/1
10 TABLET ORAL
Qty: 90 | Refills: 3 | Status: ACTIVE | COMMUNITY
Start: 2020-10-05

## 2024-05-14 ENCOUNTER — NON-APPOINTMENT (OUTPATIENT)
Age: 79
End: 2024-05-14

## 2024-05-14 ENCOUNTER — APPOINTMENT (OUTPATIENT)
Dept: CARDIOLOGY | Facility: CLINIC | Age: 79
End: 2024-05-14
Payer: MEDICARE

## 2024-05-14 VITALS
OXYGEN SATURATION: 94 % | DIASTOLIC BLOOD PRESSURE: 77 MMHG | HEIGHT: 70 IN | SYSTOLIC BLOOD PRESSURE: 153 MMHG | HEART RATE: 87 BPM | BODY MASS INDEX: 28.77 KG/M2 | WEIGHT: 201 LBS

## 2024-05-14 DIAGNOSIS — I65.23 OCCLUSION AND STENOSIS OF BILATERAL CAROTID ARTERIES: ICD-10-CM

## 2024-05-14 PROCEDURE — 93000 ELECTROCARDIOGRAM COMPLETE: CPT

## 2024-05-14 PROCEDURE — G2211 COMPLEX E/M VISIT ADD ON: CPT

## 2024-05-14 PROCEDURE — 99214 OFFICE O/P EST MOD 30 MIN: CPT

## 2024-05-14 RX ORDER — ATORVASTATIN CALCIUM 40 MG/1
40 TABLET, FILM COATED ORAL
Qty: 90 | Refills: 3 | Status: ACTIVE | COMMUNITY
Start: 2020-10-16 | End: 1900-01-01

## 2024-05-14 NOTE — HISTORY OF PRESENT ILLNESS
[FreeTextEntry1] : Christopher El presented to the office today for a follow up evaluation. He was last seen in the office 1 year ago.  He is now 78 years old, with a history of coronary artery disease, status post bypass in January of 2007. He has a history of treated hypercholesterolemia as well as treated hypertension. 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   Previously,  his blood pressure was elevated, and I increased amlodipine to 10 mg daily.   Unfortunately, he lost his wife of 55 years 4 months ago to cancer.  He is still mourning her death daily.  "He is very teary whenever he talks about her and has been visiting her grave everyday." He is able to ambulate around his neighborhood regularly without distress.  He has needed to walk slowly due to a developing right hip pain. He lives with his DTR and his 2 grandchildren. He has been seeing Dr. Summers of GI and Dr. Hall of , but he does not have a PCP. He specifically denies angina.  He denies orthopnea, PND and edema.

## 2024-05-14 NOTE — DISCUSSION/SUMMARY
[FreeTextEntry1] : Christopher is in no acute distress.  He does not report symptoms consistent with HF, angina or unstable arrhythmia.   Blood pressure is elevated and remains elevated despite resting a while in the office. Based on many blood pressure measurements he has sent to the office over the past year, his blood pressure is well-controlled.  Echocardiography was performed February 28, 2023.  This was a technically difficult study.  The ejection fraction was normal.  The aortic root at the sinuses of Valsalva was mildly dilated.  There was mild mitral and minimal aortic regurgitation.  Carotid ultrasound was performed February 28, 2023.  This revealed mild to moderate bilateral atherosclerosis.     No additional testing seems needed at this time.  He will have blood work performed.  He will see me in 6 months. [EKG obtained to assist in diagnosis and management of assessed problem(s)] : EKG obtained to assist in diagnosis and management of assessed problem(s)

## 2024-05-14 NOTE — CARDIOLOGY SUMMARY
[No Ischemia] : no Ischemia [No Exercise Ind Arr] : no exercise induced arrhythmias [No Symptoms] : no Symptoms [LVEF ___%] : LVEF [unfilled]% [None] : no pulmonary hypertension [Enlarged] : enlarged LA size [Mild] : mild mitral regurgitation [___] : [unfilled] [de-identified] : sinus rhythm\par  IRBBB\par  LAD [de-identified] : 2018 [de-identified] : 2020\par  Ef 60%\par  NML LV/RV\par  mild MR \par  dilated asc aorta 3.8 [de-identified] : 2021\par  mild - mod L\par  mild R

## 2024-05-14 NOTE — PHYSICAL EXAM
[Well Developed] : well developed [Well Nourished] : well nourished [No Acute Distress] : no acute distress [Normal Venous Pressure] : normal venous pressure [No Carotid Bruit] : no carotid bruit [Normal S1, S2] : normal S1, S2 [No Murmur] : no murmur [No Rub] : no rub [No Gallop] : no gallop [Rhythm Regular] : regular [Normal S1] : normal S1 [Normal S2] : normal S2 [No Pitting Edema] : no pitting edema present [No Abnormalities] : the abdominal aorta was not enlarged and no bruit was heard [Clear Lung Fields] : clear lung fields [Good Air Entry] : good air entry [No Respiratory Distress] : no respiratory distress  [Soft] : abdomen soft [Non Tender] : non-tender [No Masses/organomegaly] : no masses/organomegaly [Normal Bowel Sounds] : normal bowel sounds [Normal Gait] : normal gait [No Edema] : no edema [No Cyanosis] : no cyanosis [No Clubbing] : no clubbing [No Varicosities] : no varicosities [No Rash] : no rash [No Skin Lesions] : no skin lesions [Moves all extremities] : moves all extremities [No Focal Deficits] : no focal deficits [Normal Speech] : normal speech [Alert and Oriented] : alert and oriented [Normal memory] : normal memory [General Appearance - Well Developed] : well developed [Normal Appearance] : normal appearance [Well Groomed] : well groomed [General Appearance - Well Nourished] : well nourished [No Deformities] : no deformities [General Appearance - In No Acute Distress] : no acute distress [Normal Conjunctiva] : the conjunctiva exhibited no abnormalities [Eyelids - No Xanthelasma] : the eyelids demonstrated no xanthelasmas [Normal Oral Mucosa] : normal oral mucosa [No Oral Pallor] : no oral pallor [No Oral Cyanosis] : no oral cyanosis [Normal Jugular Venous A Waves Present] : normal jugular venous A waves present [Normal Jugular Venous V Waves Present] : normal jugular venous V waves present [No Jugular Venous Swain A Waves] : no jugular venous swain A waves [Abnormal Walk] : normal gait [Gait - Sufficient For Exercise Testing] : the gait was sufficient for exercise testing [Nail Clubbing] : no clubbing of the fingernails [Cyanosis, Localized] : no localized cyanosis [Petechial Hemorrhages (___cm)] : no petechial hemorrhages [Skin Color & Pigmentation] : normal skin color and pigmentation [No Venous Stasis] : no venous stasis [] : no rash [Skin Lesions] : no skin lesions [No Skin Ulcers] : no skin ulcer [No Xanthoma] : no  xanthoma was observed [Oriented To Time, Place, And Person] : oriented to person, place, and time [Affect] : the affect was normal [Mood] : the mood was normal [No Anxiety] : not feeling anxious

## 2025-01-23 ENCOUNTER — APPOINTMENT (OUTPATIENT)
Dept: CARDIOLOGY | Facility: CLINIC | Age: 80
End: 2025-01-23
Payer: MEDICARE

## 2025-01-23 VITALS — SYSTOLIC BLOOD PRESSURE: 130 MMHG | DIASTOLIC BLOOD PRESSURE: 75 MMHG

## 2025-01-23 VITALS
WEIGHT: 196 LBS | BODY MASS INDEX: 28.06 KG/M2 | SYSTOLIC BLOOD PRESSURE: 153 MMHG | HEART RATE: 83 BPM | HEIGHT: 70 IN | DIASTOLIC BLOOD PRESSURE: 78 MMHG | OXYGEN SATURATION: 98 %

## 2025-01-23 DIAGNOSIS — I25.10 ATHEROSCLEROTIC HEART DISEASE OF NATIVE CORONARY ARTERY W/OUT ANGINA PECTORIS: ICD-10-CM

## 2025-01-23 DIAGNOSIS — I65.23 OCCLUSION AND STENOSIS OF BILATERAL CAROTID ARTERIES: ICD-10-CM

## 2025-01-23 DIAGNOSIS — I25.5 ISCHEMIC CARDIOMYOPATHY: ICD-10-CM

## 2025-01-23 PROCEDURE — G2211 COMPLEX E/M VISIT ADD ON: CPT

## 2025-01-23 PROCEDURE — 99214 OFFICE O/P EST MOD 30 MIN: CPT

## 2025-01-23 PROCEDURE — 93000 ELECTROCARDIOGRAM COMPLETE: CPT

## 2025-01-24 ENCOUNTER — NON-APPOINTMENT (OUTPATIENT)
Age: 80
End: 2025-01-24

## 2025-02-06 ENCOUNTER — APPOINTMENT (OUTPATIENT)
Dept: CARDIOLOGY | Facility: CLINIC | Age: 80
End: 2025-02-06
Payer: MEDICARE

## 2025-02-06 PROCEDURE — 93306 TTE W/DOPPLER COMPLETE: CPT

## 2025-02-06 PROCEDURE — 93880 EXTRACRANIAL BILAT STUDY: CPT

## 2025-07-30 ENCOUNTER — APPOINTMENT (OUTPATIENT)
Dept: CARDIOLOGY | Facility: CLINIC | Age: 80
End: 2025-07-30
Payer: MEDICARE

## 2025-07-30 VITALS
SYSTOLIC BLOOD PRESSURE: 153 MMHG | WEIGHT: 198 LBS | BODY MASS INDEX: 28.35 KG/M2 | DIASTOLIC BLOOD PRESSURE: 69 MMHG | HEIGHT: 70 IN | HEART RATE: 96 BPM | OXYGEN SATURATION: 94 %

## 2025-07-30 DIAGNOSIS — I25.10 ATHEROSCLEROTIC HEART DISEASE OF NATIVE CORONARY ARTERY W/OUT ANGINA PECTORIS: ICD-10-CM

## 2025-07-30 PROCEDURE — 93000 ELECTROCARDIOGRAM COMPLETE: CPT

## 2025-07-30 PROCEDURE — 99214 OFFICE O/P EST MOD 30 MIN: CPT

## 2025-08-18 ENCOUNTER — NON-APPOINTMENT (OUTPATIENT)
Age: 80
End: 2025-08-18

## 2025-08-20 ENCOUNTER — APPOINTMENT (OUTPATIENT)
Dept: CARDIOLOGY | Facility: CLINIC | Age: 80
End: 2025-08-20
Payer: MEDICARE

## 2025-08-20 PROCEDURE — 78452 HT MUSCLE IMAGE SPECT MULT: CPT

## 2025-08-20 PROCEDURE — 93015 CV STRESS TEST SUPVJ I&R: CPT

## 2025-08-20 PROCEDURE — A9500: CPT

## 2025-08-25 DIAGNOSIS — I25.10 ATHEROSCLEROTIC HEART DISEASE OF NATIVE CORONARY ARTERY W/OUT ANGINA PECTORIS: ICD-10-CM
